# Patient Record
Sex: FEMALE | Race: WHITE | NOT HISPANIC OR LATINO | Employment: OTHER | URBAN - METROPOLITAN AREA
[De-identification: names, ages, dates, MRNs, and addresses within clinical notes are randomized per-mention and may not be internally consistent; named-entity substitution may affect disease eponyms.]

---

## 2017-01-05 ENCOUNTER — HISTORICAL (OUTPATIENT)
Dept: ADMINISTRATIVE | Facility: HOSPITAL | Age: 57
End: 2017-01-05

## 2017-01-09 ENCOUNTER — HOSPITAL ENCOUNTER (OUTPATIENT)
Dept: MEDSURG UNIT | Facility: HOSPITAL | Age: 57
End: 2017-01-12
Attending: INTERNAL MEDICINE | Admitting: INTERNAL MEDICINE

## 2017-01-16 ENCOUNTER — HISTORICAL (OUTPATIENT)
Dept: ANESTHESIOLOGY | Facility: HOSPITAL | Age: 57
End: 2017-01-16

## 2017-01-23 ENCOUNTER — HISTORICAL (OUTPATIENT)
Dept: PREADMISSION TESTING | Facility: HOSPITAL | Age: 57
End: 2017-01-23

## 2017-01-26 ENCOUNTER — HISTORICAL (OUTPATIENT)
Dept: SURGERY | Facility: HOSPITAL | Age: 57
End: 2017-01-26

## 2017-06-09 ENCOUNTER — HISTORICAL (OUTPATIENT)
Dept: ADMINISTRATIVE | Facility: HOSPITAL | Age: 57
End: 2017-06-09

## 2017-06-13 ENCOUNTER — HISTORICAL (OUTPATIENT)
Dept: RADIOLOGY | Facility: HOSPITAL | Age: 57
End: 2017-06-13

## 2017-08-08 ENCOUNTER — HISTORICAL (OUTPATIENT)
Dept: ADMINISTRATIVE | Facility: HOSPITAL | Age: 57
End: 2017-08-08

## 2017-10-17 ENCOUNTER — HISTORICAL (OUTPATIENT)
Dept: ADMINISTRATIVE | Facility: HOSPITAL | Age: 57
End: 2017-10-17

## 2018-10-16 ENCOUNTER — HISTORICAL (OUTPATIENT)
Dept: ADMINISTRATIVE | Facility: HOSPITAL | Age: 58
End: 2018-10-16

## 2019-04-08 ENCOUNTER — HISTORICAL (OUTPATIENT)
Dept: ADMINISTRATIVE | Facility: HOSPITAL | Age: 59
End: 2019-04-08

## 2019-10-15 ENCOUNTER — HISTORICAL (OUTPATIENT)
Dept: ADMINISTRATIVE | Facility: HOSPITAL | Age: 59
End: 2019-10-15

## 2019-11-22 ENCOUNTER — HISTORICAL (OUTPATIENT)
Dept: ADMINISTRATIVE | Facility: HOSPITAL | Age: 59
End: 2019-11-22

## 2020-03-09 ENCOUNTER — HISTORICAL (OUTPATIENT)
Dept: ADMINISTRATIVE | Facility: HOSPITAL | Age: 60
End: 2020-03-09

## 2021-02-01 ENCOUNTER — HOSPITAL ENCOUNTER (OUTPATIENT)
Dept: ADMINISTRATIVE | Facility: HOSPITAL | Age: 61
End: 2021-02-02
Attending: INTERNAL MEDICINE | Admitting: INTERNAL MEDICINE

## 2021-09-08 LAB
CHOLEST SERPL-MSCNC: 247 MG/DL (ref 0–200)
HDLC SERPL-MCNC: 52 MG/DL (ref 35–70)
LDLC SERPL CALC-MCNC: 32 MG/DL (ref 0–160)
TRIGL SERPL-MCNC: 175 MG/DL (ref 40–160)

## 2021-10-18 LAB — CRC RECOMMENDATION EXT: NORMAL

## 2022-01-28 LAB — BCS RECOMMENDATION EXT: NORMAL

## 2022-04-07 ENCOUNTER — HISTORICAL (OUTPATIENT)
Dept: ADMINISTRATIVE | Facility: HOSPITAL | Age: 62
End: 2022-04-07

## 2022-04-23 VITALS
WEIGHT: 150.13 LBS | HEIGHT: 63 IN | SYSTOLIC BLOOD PRESSURE: 132 MMHG | BODY MASS INDEX: 26.6 KG/M2 | DIASTOLIC BLOOD PRESSURE: 88 MMHG

## 2022-04-28 NOTE — ED PROVIDER NOTES
Patient:   Sania Cortez            MRN: [id number]            FIN: [id number]               Age:   60 years     Sex:  Female     :  1960   Associated Diagnoses:   None   Author:   Nilo Bruno MD      Addendum      Teaching-Supervisory Addendum-Brief   I participated in the following activities of this patients care: the medical history, the physical exam, medical decision making.   I personally performed: supervision of the patient's care, the medical history, the physical exam.   The case was discussed with: the physician assistant.   Evaluation and management service: I agree with the evaluation and management decisions made in this patient's care.   Results interpretation: I agree with the study interpretation in this patient's care.   Notes: I had face-to-face interaction with this patient..   None face-to-face interaction with this patient reveals that she's been having some shoulder pain for a few days.  This morning she had a chest pressure sensation prompted her to come to the emergency department.  Resolved prior to arrival.  She is a family history of CAD as well as a personal history of hypertension.  On physical exam heart tones are normal and lungs are clear to auscultation.  Initial ED workup is unremarkable.  Have discussed with on-call cardiologist and will admit for serial enzymes and rule out

## 2022-04-28 NOTE — ED PROVIDER NOTES
Patient:   Sania Cortez            MRN: [id number]            FIN: [id number]               Age:   60 years     Sex:  Female     :  1960   Associated Diagnoses:   Chest heaviness   Author:   Isma Gonzalez      Basic Information   Time seen: Date & time 2021 10:55:00.   History source: Patient, family.   Arrival mode: Private vehicle, walking.   History limitation: None.   Additional information: Patient's physician(s): Ashtyn YI, [address], Chief Complaint from Nursing Triage Note : Chief Complaint   2021 10:54 CST       Chief Complaint           L shoulder pain x 3 days intermittenet L sided CP. no SOB.    .      History of Present Illness   The patient presents with chest pain and Patient states left sided chest pain and shoulder pain x three days. states nausea. denies any SOB (OMID gallardo).   I Isma WILL assumed care of patient at 1415, female who reports over the last 3 days having left shoulder ache not improved with Advil last night having difficulty resting reports this morning having 15 minutes of left-sided chest pressure taking morning metoprolol medication improved with rest.   Patient reports having mild anxiety when remembering families cardiac history.  Patient transported by spouse to emergency department for evaluation..  The onset was 3  days ago.  Location: Left chest. The character of symptoms is heaviness.  Risk factors consist of hypertension and not diabetes mellitus.  Associated symptoms: anxiety, denies vomiting, denies diaphoresis and denies palpitations.        Review of Systems   Constitutional symptoms:  No fever, no chills, no fatigue.    Respiratory symptoms:  No shortness of breath, no cough, no sputum production.    Cardiovascular symptoms:  Chest pain, no palpitations, no tachycardia, no diaphoresis.    Gastrointestinal symptoms:  No abdominal pain, no nausea, no vomiting, no diarrhea.    Musculoskeletal  symptoms:  No back pain, no Muscle pain, no Joint pain.    Neurologic symptoms:  Dizziness, no headache, no altered level of consciousness.    Psychiatric symptoms:  Sleeping problems.   Endocrine symptoms:  Negative except as documented in HPI.   Hematologic/Lymphatic symptoms:  Negative except as documented in HPI.      Health Status   Allergies:    Allergic Reactions (Selected)  Severity Not Documented  Codeine- Vomiting.,    Allergies (1) Active Reaction  codeine vomiting  .   Medications:  (Selected)   Inpatient Medications  Ordered  Zofran 4 mg oral tablet: 4 mg, form: Tab, Oral, Once, first dose 01/26/17 14:42:00 CST, stop date 01/26/17 14:42:00 CST, 24  Pending Complete  midazolam: 2 mg, form: Injection, IV Push, q5min, Order duration: 2 dose(s), first dose 01/26/17 12:35:00 CST, stop date 01/26/17 12:44:00 CST, (up to 5 mg for moderate anxiety), 30,025  Prescriptions  Prescribed  calcium carbonate 500 mg oral tablet, chewable: 1,000 mg = 2 tab(s), Chewed, BID, # 20 tab(s), 0 Refill(s)  Documented Medications  Documented  CALCITRIOL 0.5 MCG CAPSULE: mcg cap(s), Oral  CoQ10 300 mg oral capsule: 300 mg = 1 cap(s), Oral, Daily, # 100 cap(s), 0 Refill(s)  Fish Oil 1200 mg oral capsule: 1 tab, Oral, BID, 0 Refill(s)  MAGNESIUM OXIDE 400 MG TABLET: mg tab(s), Oral  METOPROLOL SUCC ER 50 MG TAB: 50 mg = 1 tab(s), Oral, qAM  Synthroid 112 mcg (0.112 mg) oral tablet: 112 mcg = 1 tab(s), Oral, Daily  red yeast rice 600 mg oral capsule: 1 tab, Oral, BID, 0 Refill(s).      Past Medical/ Family/ Social History   Medical history: Reviewed as documented in chart, Biliary dyskinesia   High blood pressure   High cholesterol   Kidney stones   Mitral valve prolapse   Reflux gastritis   Thyroid cancer   .   Surgical history:    Cholecystectomy Laparoscopic (None) on 1/26/2017 at 56 Years.  Comments:  1/26/2017 13:46 CST - Roselia BATRES, Aurelia PEARSON  auto-populated from documented surgical case  Endoscopic Ultrasonography (Upper,  Upper, Upper) on 2017 at 56 Years.  Comments:  2017 13:34 [facility] - Patrica BATRES, Milan SOLOMON  auto-populated from documented surgical case  Esophagogastroduodenoscopy on 1/10/2017 at 56 Years.  Comments:  1/10/2017 13:16 BEVERLEY Linn RN, Farzad Nelson  auto-populated from documented surgical case  hysterectomy.  thyroidectomy.  Colonoscopy ([id number]).  Comments:  2019 15:34 CDT - Leidy Sweeney LPN  DONE IN 2017 WNL REPEAT 10 YEARS.   Family history:    Cardiac arrest.  Father ()  Mother  Hypertension.  Father ()  Hyperlipidemia.  Father ()  Mother  .   Social history:    Social & Psychosocial Habits    Alcohol  2019  Use: Current    Frequency: 1-2 times per month    Employment/School  2019  Status: Employed    Exercise  2019  Duration (average number of minutes): 30    Times per week: 3-4 times/week    Exercise type: Walking    Home/Environment  2019  Lives with: Spouse    Nutrition/Health  2019  Type of diet: Regular    Substance Use  2017  Use: Never    Tobacco  2019  Use: Never (less than 100 in l    Patient Wants Consult For Cessation Counseling N/A    .   Problem list:    Active Problems (7)  Biliary dyskinesia   High blood pressure   High cholesterol   Kidney stones   Mitral valve prolapse   Reflux gastritis   Thyroid cancer     .      Physical Examination               Vital Signs   Vital Signs   2021 10:54 CST       Temperature Oral          36.2 DegC                             Temperature Oral (calculated)             97.16 DegF                             Peripheral Pulse Rate     76 bpm                             Respiratory Rate          14 br/min                             SpO2                      100 %                             Oxygen Therapy            Room air                             Systolic Blood Pressure   185 mmHg  HI                             Diastolic Blood Pressure  98 mmHg  HI  .   Oxygen  saturation.   General:  Alert, no acute distress, Awake alert female resting in bed.    Skin:  Warm, dry, pink, normal for ethnicity.    Head:  Normocephalic, atraumatic.    Neck:  Supple, trachea midline, no JVD.    Eye:  Normal conjunctiva.   Cardiovascular:  Regular rate and rhythm, Normal peripheral perfusion, No edema.    Respiratory:  Lungs are clear to auscultation, respirations are non-labored, breath sounds are equal, Symmetrical chest wall expansion.    Chest wall:  No tenderness, No deformity.    Back:  Nontender, Normal range of motion.    Musculoskeletal:  All other adjacent joints normal, Proximal upper extremity: Left, anterior, shoulder, tenderness, range of motion normal, no swelling, no erythema, no ecchymosis.    Gastrointestinal:  Soft, Nontender, Non distended.    Neurological:  Alert and oriented to person, place, time, and situation, No focal neurological deficit observed, normal sensory observed, normal motor observed, normal speech observed.    Psychiatric:  Cooperative.      Medical Decision Making   Documents reviewed:  Emergency department nurses' notes, prior records.    Orders  Launch Orders   Pharmacy:  cloniDINE 0.1 mg oral tablet (Order): 0.1 mg, form: Tab, Oral, Once, first dose 2/1/2021 14:27 CST, stop date 2/1/2021 14:27 CST, STAT  , Launch Orders   Pharmacy:  aspirin 325 mg oral tablet (Order): 325 mg, form: Tab, Oral, Once, first dose 2/1/2021 14:55 CST, stop date 2/1/2021 14:55 CST, STAT, 4 chew tab = 5 grain ASA  , launch Order Profile (Selected)   Inpatient Orders  Completed  Automated Diff: NOW collect, 02/01/21 11:08:00 CST, Blood, Collected, Stop date 02/01/21 11:08:00 CST, Lab Collect, Print Label By Order Location, 02/01/21 10:56:00 CST  CBC w/ Auto Diff: NOW collect, 02/01/21 11:08:22 CST, BLOOD, Collected, Stop date 02/01/21 11:08:00 CST, Lab Collect  CMP: STAT collect, 02/01/21 11:08:22 CST, BLOOD, Collected, Stop date 02/01/21 11:08:00 CST, Lab Collect  Estimated  Glomerular Filtration Rate: STAT collect, 02/01/21 11:08:00 CST, Blood, Collected, Stop date 02/01/21 11:08:00 CST, Lab Collect, Print Label By Order Location, 02/01/21 10:56:00 CST  POC Istat ER Troponin: Blood, Stat collect, Collected, 02/01/21 11:17:20 CST  POC iSTAT ER Troponin request: BLOOD, STAT collect, Collected, 02/01/21 11:08:22 CST, Stop date 02/01/21 11:08:00 CST, Lab Collect, Print Label By Order Location  Troponin-I: STAT collect, 02/01/21 11:08:22 CST, BLOOD, Collected, Stop date 02/01/21 11:08:00 CST, Lab Collect  XR Chest 2 Views: Stat, 02/01/21 10:56:00 CST, Chest Pain, None, Stretcher, Patient Has IV?, Rad Type, Not Scheduled, 02/01/21 10:56:00 CST  .    Electrocardiogram:  Time 2/1/2021 10:58:00, rate 75, normal sinus rhythm, no ectopy, normal MO & QRS intervals, EP Interp, T wave Inversion, V3, Previous EKG available Compared with 1/9/2017 11:18:00.    Results review:  Lab results : Lab View   2/1/2021 11:56 CST       Est Creat Clearance Ser   44.57 mL/min    2/1/2021 11:17 CST       POC Troponin              0.00 ng/mL    2/1/2021 11:08 CST       Sodium Lvl                141 mmol/L                             Potassium Lvl             5.1 mmol/L                             Chloride                  104 mmol/L                             CO2                       27 mmol/L                             Calcium Lvl               9.0 mg/dL                             Glucose Lvl               102 mg/dL                             BUN                       20.1 mg/dL                             Creatinine                1.11 mg/dL  HI                             eGFR-AA                   >60  NA                             eGFR-BARAK                  53 mL/min/1.73 m2  NA                             Bili Total                0.4 mg/dL                             Bili Direct               0.1 mg/dL                             Bili Indirect             0.30 mg/dL                             AST                        38 unit/L  HI                             ALT                       40 unit/L                             Alk Phos                  64 unit/L                             Total Protein             7.3 gm/dL                             Albumin Lvl               4.5 gm/dL                             Globulin                  2.8 gm/dL                             A/G Ratio                 1.6 ratio                             Troponin-I                <0.010 ng/mL                             WBC                       6.1 x10(3)/mcL                             RBC                       4.59 x10(6)/mcL                             Hgb                       13.0 gm/dL                             Hct                       41.5 %                             Platelet                  217 x10(3)/mcL                             MCV                       90.4 fL                             MCH                       28.3 pg                             MCHC                      31.3 gm/dL  LOW                             RDW                       14.6 %                             MPV                       10.3 fL                             Abs Neut                  3.25 x10(3)/mcL                             Neutro Auto               53 %  NA                             Lymph Auto                40 %  NA                             Mono Auto                 4 %  NA                             Eos Auto                  2 %  NA                             Abs Eos                   0.1 x10(3)/mcL                             Basophil Auto             0 %  NA                             Abs Neutro                3.25 x10(3)/mcL                             Abs Lymph                 2.4 x10(3)/mcL                             Abs Mono                  0.3 x10(3)/mcL                             Abs Baso                  0.0 x10(3)/mcL    .   Radiology results:  Rad Results (ST)  < 12 hrs   Accession: [id number]  Order: XR  Chest 2 Views  Report Dt/Tm: 02/01/2021 11:21  Report:      CLINICAL:  Chest pain.     COMPARISON: January 23, 2017.     FINDINGS:  Cardiopericardial silhouette is within normal limits.   Lungs are without dense focal or segmental consolidation, congestion,  pleural effusion or pneumothorax. Scoliosis.     IMPRESSION:     No acute cardiopulmonary process identified.           .      Reexamination/ Reevaluation   Vital signs   results included from flowsheet : Vital Signs   2/1/2021 14:16 CST       Peripheral Pulse Rate     72 bpm                             SpO2                      100 %                             Oxygen Therapy            Room air                             Systolic Blood Pressure   173 mmHg  HI                             Diastolic Blood Pressure  99 mmHg  HI                             Mean Arterial Pressure, Cuff              124 mmHg     Assessment: While here in the ED, the pt remained hemodynamically stable with ABC's intact and in NaD.  Pt is afebrile and nontoxic.  Pt AAOx4 resting in ED room with no return of chest heaviness.  Pt understands labs, EKG xray results, concern for shoulder pain and chest heaviness.  Case reviewed in ER with Dr. Bruno.  Cardiology contacted and will admit..      Impression and Plan   Diagnosis   Chest heaviness (LBA42-OY R07.89)      Calls-Consults   -  2/1/2021 14:35:00 , Kirill Roberson, Card, phone call, recommends admit.    Plan   Condition: Stable.    Disposition: Admit.    Counseled: Patient, Family, Regarding diagnosis, Regarding diagnostic results, Regarding treatment plan, Patient indicated understanding of instructions.

## 2022-04-28 NOTE — H&P
"   Patient:   Sania Cortez            MRN: [id number]            FIN: [id number]               Age:   60 years     Sex:  Female     :  1960   Associated Diagnoses:   None   Author:   Kirill Roberson      Basic Information   Source of history:  Self, Medical record.    Referral source:  Emergency department.    History limitation:  None.    Resusitation Status:  Full Code.       Chief Complaint   2021 10:54 CST       L shoulder pain x 3 days intermittenet L sided CP. no SOB.        History of Present Illness   Mrs. Cortez is a 59yo female with a history of HTN, HLD, CKD II, MVP, and thyroid cancer s/p thyroidectomy who presented to the ER with complaints of chest pain. She states noticing intermittent chest "heaviness" that lasts 2-3 minutes.  She also reports left shoulder pain, worse at night. She denies any prior cardiac history other than MVP but does have a strong family history of CAD.  Upon evaluation in the ER she was hypertensive.  Her initial work up was negative other than some anterior T wave inversions on her EKG.  She was admitted for further evaluation.  MI has been ruled out by serial cardiac enzymes and she denies any recurrence of chest pain.       Review of Systems   Constitutional:  Negative.    Respiratory:  Negative.    Cardiovascular:  Negative.    Gastrointestinal:  Negative.    Genitourinary:  Negative.    Musculoskeletal:  Negative.    Integumentary:  Negative.    Neurologic:  Alert and oriented X4.       Health Status   Allergies:    Allergic Reactions (All)  Severity Not Documented  Codeine- Vomiting.   Problem list:    All Problems  Biliary dyskinesia / SNOMED CT 377713628 / Confirmed  High blood pressure / SNOMED CT 19753111 / Confirmed  High cholesterol / SNOMED CT 1812729791 / Confirmed  Kidney stones / SNOMED CT [id number] / Confirmed  Mitral valve prolapse / SNOMED CT 2571620780 / Confirmed  Reflux gastritis / SNOMED CT " 23626981 / Confirmed  Thyroid cancer / SNOMED CT 0547385695 / Confirmed      Histories   Past Medical History:    No active or resolved past medical history items have been selected or recorded.   Procedure history:    Cholecystectomy Laparoscopic (None) on 1/26/2017 at 56 Years.  Comments:  1/26/2017 13:46 [facility] - Roselia BATRES, Aurelia PEARSON  auto-populated from documented surgical case  Endoscopic Ultrasonography (Upper, Upper, Upper) on 1/11/2017 at 56 Years.  Comments:  1/11/2017 13:34 [facility] - Patrica BATRSE, Milan SOLOMON  auto-populated from documented surgical case  Esophagogastroduodenoscopy on 1/10/2017 at 56 Years.  Comments:  1/10/2017 13:16 [facility] - Kaveh BATRES, Farzad Nelson  auto-populated from documented surgical case  hysterectomy.  thyroidectomy.  Colonoscopy ([id number]).  Comments:  4/29/2019 15:34 CDT - Leidy Sweeney LPN  DONE IN JAN 2017 WNL REPEAT 10 YEARS      Physical Examination   General:  Alert and oriented, No acute distress.    HENT:  Normocephalic.    Neck:  Supple, Non-tender.    Respiratory:  Lungs are clear to auscultation, Respirations are non-labored, Breath sounds are equal.    Cardiovascular:  Normal rate, Regular rhythm.    Gastrointestinal:  Soft, Non-tender.    Musculoskeletal:  Normal range of motion, Normal strength.    Integumentary:  Warm, Dry, Pink.    Neurologic:  Alert, Oriented.    Psychiatric:  Cooperative, Appropriate mood & affect.       Impression and Plan   Chest pain      MI ruled out by serial cardiac enzymes x 3      denies any further chest pain      EKG revealed NSR with TWI in anterior leads     will d/c home today and have her follow up in our office next Wednesday with a nuclear stress test         she does have a strong family history of CAD    HTN     controlled     continue home medications    CKD      renal indices stable

## 2022-09-09 ENCOUNTER — DOCUMENTATION ONLY (OUTPATIENT)
Dept: INTERNAL MEDICINE | Facility: CLINIC | Age: 62
End: 2022-09-09
Payer: COMMERCIAL

## 2022-09-19 ENCOUNTER — DOCUMENTATION ONLY (OUTPATIENT)
Dept: INTERNAL MEDICINE | Facility: CLINIC | Age: 62
End: 2022-09-19
Payer: COMMERCIAL

## 2022-09-28 LAB
CHOLEST SERPL-MSCNC: 257 MG/DL (ref 100–199)
HDLC SERPL-MCNC: 56 MG/DL (ref 39–?)
LDLC SERPL CALC-MCNC: 175 MG/DL (ref 0–99)
TRIGL SERPL-MCNC: 142 MG/DL (ref 0–149)

## 2022-09-29 ENCOUNTER — TELEPHONE (OUTPATIENT)
Dept: INTERNAL MEDICINE | Facility: CLINIC | Age: 62
End: 2022-09-29
Payer: COMMERCIAL

## 2022-09-30 ENCOUNTER — DOCUMENTATION ONLY (OUTPATIENT)
Dept: INTERNAL MEDICINE | Facility: CLINIC | Age: 62
End: 2022-09-30
Payer: COMMERCIAL

## 2022-09-30 RX ORDER — METOPROLOL SUCCINATE 50 MG/1
75 TABLET, EXTENDED RELEASE ORAL DAILY
COMMUNITY
Start: 2022-09-14

## 2022-09-30 RX ORDER — TIZANIDINE 4 MG/1
4 TABLET ORAL NIGHTLY PRN
COMMUNITY
Start: 2022-04-08 | End: 2022-10-06

## 2022-09-30 RX ORDER — LOSARTAN POTASSIUM 25 MG/1
25 TABLET ORAL DAILY
COMMUNITY
Start: 2022-09-23

## 2022-09-30 RX ORDER — LEVOTHYROXINE SODIUM 100 UG/1
1 TABLET ORAL DAILY
COMMUNITY
Start: 2022-07-08

## 2022-09-30 RX ORDER — CALCITRIOL 0.5 UG/1
0.5 CAPSULE ORAL DAILY
COMMUNITY
Start: 2022-08-07

## 2022-10-01 ENCOUNTER — DOCUMENTATION ONLY (OUTPATIENT)
Dept: INTERNAL MEDICINE | Facility: CLINIC | Age: 62
End: 2022-10-01
Payer: COMMERCIAL

## 2022-10-06 ENCOUNTER — OFFICE VISIT (OUTPATIENT)
Dept: INTERNAL MEDICINE | Facility: CLINIC | Age: 62
End: 2022-10-06
Payer: COMMERCIAL

## 2022-10-06 VITALS
WEIGHT: 148.19 LBS | HEART RATE: 79 BPM | DIASTOLIC BLOOD PRESSURE: 84 MMHG | SYSTOLIC BLOOD PRESSURE: 134 MMHG | HEIGHT: 62 IN | OXYGEN SATURATION: 98 % | BODY MASS INDEX: 27.27 KG/M2 | TEMPERATURE: 99 F

## 2022-10-06 DIAGNOSIS — M25.519 SHOULDER PAIN, UNSPECIFIED CHRONICITY, UNSPECIFIED LATERALITY: ICD-10-CM

## 2022-10-06 DIAGNOSIS — Z00.00 WELLNESS EXAMINATION: Primary | ICD-10-CM

## 2022-10-06 PROCEDURE — 1160F RVW MEDS BY RX/DR IN RCRD: CPT | Mod: CPTII,,, | Performed by: INTERNAL MEDICINE

## 2022-10-06 PROCEDURE — 3075F PR MOST RECENT SYSTOLIC BLOOD PRESS GE 130-139MM HG: ICD-10-PCS | Mod: CPTII,,, | Performed by: INTERNAL MEDICINE

## 2022-10-06 PROCEDURE — 4010F ACE/ARB THERAPY RXD/TAKEN: CPT | Mod: CPTII,,, | Performed by: INTERNAL MEDICINE

## 2022-10-06 PROCEDURE — 1159F PR MEDICATION LIST DOCUMENTED IN MEDICAL RECORD: ICD-10-PCS | Mod: CPTII,,, | Performed by: INTERNAL MEDICINE

## 2022-10-06 PROCEDURE — 4010F PR ACE/ARB THEARPY RXD/TAKEN: ICD-10-PCS | Mod: CPTII,,, | Performed by: INTERNAL MEDICINE

## 2022-10-06 PROCEDURE — 99396 PREV VISIT EST AGE 40-64: CPT | Mod: ,,, | Performed by: INTERNAL MEDICINE

## 2022-10-06 PROCEDURE — 3079F PR MOST RECENT DIASTOLIC BLOOD PRESSURE 80-89 MM HG: ICD-10-PCS | Mod: CPTII,,, | Performed by: INTERNAL MEDICINE

## 2022-10-06 PROCEDURE — 3008F BODY MASS INDEX DOCD: CPT | Mod: CPTII,,, | Performed by: INTERNAL MEDICINE

## 2022-10-06 PROCEDURE — 3008F PR BODY MASS INDEX (BMI) DOCUMENTED: ICD-10-PCS | Mod: CPTII,,, | Performed by: INTERNAL MEDICINE

## 2022-10-06 PROCEDURE — 1160F PR REVIEW ALL MEDS BY PRESCRIBER/CLIN PHARMACIST DOCUMENTED: ICD-10-PCS | Mod: CPTII,,, | Performed by: INTERNAL MEDICINE

## 2022-10-06 PROCEDURE — 99396 PR PREVENTIVE VISIT,EST,40-64: ICD-10-PCS | Mod: ,,, | Performed by: INTERNAL MEDICINE

## 2022-10-06 PROCEDURE — 1159F MED LIST DOCD IN RCRD: CPT | Mod: CPTII,,, | Performed by: INTERNAL MEDICINE

## 2022-10-06 PROCEDURE — 3079F DIAST BP 80-89 MM HG: CPT | Mod: CPTII,,, | Performed by: INTERNAL MEDICINE

## 2022-10-06 PROCEDURE — 3075F SYST BP GE 130 - 139MM HG: CPT | Mod: CPTII,,, | Performed by: INTERNAL MEDICINE

## 2022-10-06 RX ORDER — MAGNESIUM 30 MG
1 TABLET ORAL ONCE
COMMUNITY

## 2022-10-06 NOTE — PROGRESS NOTES
Julio Helms MD        PATIENT NAME: Sania Cortez  : 1960  DATE: 10/6/22  MRN: [id number]      Billing Provider: Julio Helms MD  Level of Service: [place] PREVENTIVE VISIT,EST,40-64  Patient PCP Information       Provider PCP Type    Julio Helms MD General            Reason for Visit / Chief Complaint: Annual Exam (Wellness)       Update PCP  Update Chief Complaint         History of Present Illness / Problem Focused Workflow     Sania Cortez presents to the clinic with Annual Exam (Wellness)     Rukhsana is here for annual wellness exam   She is having a problem with chronic left shoulder pain   She went to the orthopedic urgent care and had an x-ray in therapy with still having pain.      Review of Systems   Review of Systems   Constitutional: Negative.    HENT: Negative.     Eyes: Negative.    Respiratory: Negative.     Cardiovascular: Negative.    Gastrointestinal: Negative.    Endocrine: Negative.    Genitourinary: Negative.    Musculoskeletal:  Positive for arthralgias.   Integumentary:  Negative.   Neurological: Negative.    Psychiatric/Behavioral: Negative.        Medical / Social / Family History     Past Medical History:   Diagnosis Date    Anemia, unspecified     GERD (gastroesophageal reflux disease)     Hydronephrosis     Hypocalcemia     Hypothyroidism, unspecified     Kidney stones     Osteopenia     Tachycardia     Thyroid cancer        Past Surgical History:   Procedure Laterality Date    CHOLECYSTECTOMY  2017    COLONOSCOPY  10/18/2021    HYSTERECTOMY  2001       Social History  Ms. Cortze  reports that she has never smoked. She has never used smokeless tobacco. She reports current alcohol use of about 2.0 standard drinks per week. She reports that she does not use drugs.    Family History  Ms.'s Cortez  family history includes Heart disease in her father and mother.    Medications and Allergies     Medications  Outpatient Medications Marked as Taking  for the 10/6/22 encounter (Office Visit) with Julio Chamorro MD   Medication Sig Dispense Refill    calcitRIOL (ROCALTROL) 0.5 MCG Cap Take 0.5 mcg by mouth once daily.      calcium cit/Mgox/vit D3/B6/min (CITRACAL PLUS ORAL) Take 1 tablet by mouth Daily. 1200 mg      losartan (COZAAR) 25 MG tablet Take 25 mg by mouth once daily.      magnesium 30 mg Tab Take 1 tablet by mouth once. 250 mg      metoprolol succinate (TOPROL-XL) 50 MG 24 hr tablet Take 75 mg by mouth once daily.      SYNTHROID 100 mcg tablet Take 1 tablet by mouth once daily.         Allergies  Review of patient's allergies indicates:   Allergen Reactions    Aspirin     Codeine Nausea And Vomiting     Other reaction(s): vomiting       Physical Examination     Vitals:    10/06/22 1036   BP: 134/84   Pulse: 79   Temp: 98.7 °F (37.1 °C)     Physical Exam  Constitutional:       Appearance: Normal appearance.   HENT:      Head: Normocephalic and atraumatic.      Right Ear: Tympanic membrane normal.      Left Ear: Tympanic membrane normal.      Nose: Nose normal.      Mouth/Throat:      Mouth: Mucous membranes are moist.   Eyes:      Extraocular Movements: Extraocular movements intact.      Pupils: Pupils are equal, round, and reactive to light.   Cardiovascular:      Rate and Rhythm: Normal rate and regular rhythm.      Pulses: Normal pulses.   Pulmonary:      Effort: Pulmonary effort is normal.      Breath sounds: Normal breath sounds.   Abdominal:      General: Abdomen is flat. Bowel sounds are normal.      Palpations: Abdomen is soft.   Musculoskeletal:         General: Normal range of motion.      Cervical back: Normal range of motion and neck supple.      Comments: Pain with movement in the left shoulder joint   Skin:     General: Skin is warm and dry.   Neurological:      General: No focal deficit present.      Mental Status: She is alert and oriented to person, place, and time.   Psychiatric:         Mood and Affect: Mood normal.          Behavior: Behavior normal.        Assessment and Plan (including Health Maintenance)      Problem List  Smart Sets  Document Outside HM   :    Plan:   Wellness examination    Shoulder pain, unspecified chronicity, unspecified laterality     Discussion all lab stable   Cholesterol his elevated bill 10 point since last year   Thyroid and calcium levels are stable   Referral made to orthopedics for shoulder evaluation   Revisit 1 year annual wellness           Health Maintenance Due   Topic Date Due    Shingles Vaccine (1 of 2) Never done    Influenza Vaccine (1) 09/01/2022       Problem List Items Addressed This Visit    None  Visit Diagnoses       Wellness examination    -  Primary    Shoulder pain, unspecified chronicity, unspecified laterality                Health Maintenance Topics with due status: Not Due       Topic Last Completion Date    Colorectal Cancer Screening 10/18/2021    Mammogram 01/28/2022    Lipid Panel 09/28/2022       No future appointments.         Signature:  Julio Helms MD  [facility]  [address] [place] [address]  [place] [id number]    Date of encounter: 10/6/22

## 2023-02-03 LAB — BCS RECOMMENDATION EXT: NORMAL

## 2023-02-08 ENCOUNTER — DOCUMENTATION ONLY (OUTPATIENT)
Dept: INTERNAL MEDICINE | Facility: CLINIC | Age: 63
End: 2023-02-08
Payer: COMMERCIAL

## 2024-06-04 ENCOUNTER — TELEPHONE (OUTPATIENT)
Dept: INTERNAL MEDICINE | Facility: CLINIC | Age: 64
End: 2024-06-04
Payer: COMMERCIAL

## 2024-06-04 NOTE — TELEPHONE ENCOUNTER
----- Message from Candy Dominguez sent at 6/4/2024 10:17 AM CDT -----  Who Called: Sania Mcmanus Dana    Caller is requesting assistance/information from provider's office.    Symptoms (please be specific):    How long has patient had these symptoms:    List of preferred pharmacies on file (remove unneeded): [unfilled]  If different, enter pharmacy into here including location and phone number:       Preferred Method of Contact: Phone Call  Patient's Preferred Phone Number on File: [phone]   Best Call Back Number, if different:  Additional Information: Sania is requesting information regarding when she had thyroid cancer. Did give # to medical records as well.

## 2024-07-27 ENCOUNTER — OFFICE VISIT (OUTPATIENT)
Dept: URGENT CARE | Facility: CLINIC | Age: 64
End: 2024-07-27
Payer: COMMERCIAL

## 2024-07-27 VITALS
DIASTOLIC BLOOD PRESSURE: 82 MMHG | TEMPERATURE: 99 F | BODY MASS INDEX: 27.07 KG/M2 | WEIGHT: 148 LBS | OXYGEN SATURATION: 100 % | SYSTOLIC BLOOD PRESSURE: 120 MMHG | HEART RATE: 96 BPM | RESPIRATION RATE: 18 BRPM

## 2024-07-27 DIAGNOSIS — R42 DIZZINESS: Primary | ICD-10-CM

## 2024-07-27 PROCEDURE — 99213 OFFICE O/P EST LOW 20 MIN: CPT | Mod: ,,, | Performed by: NURSE PRACTITIONER

## 2024-07-27 RX ORDER — PREDNISONE 20 MG/1
20 TABLET ORAL 2 TIMES DAILY
Qty: 6 TABLET | Refills: 0 | Status: SHIPPED | OUTPATIENT
Start: 2024-07-27 | End: 2024-07-30

## 2024-07-27 RX ORDER — MECLIZINE HYDROCHLORIDE 25 MG/1
25 TABLET ORAL 3 TIMES DAILY PRN
Qty: 21 TABLET | Refills: 0 | Status: SHIPPED | OUTPATIENT
Start: 2024-07-27

## 2024-07-27 NOTE — PROGRESS NOTES
Subjective:      Patient ID: Sania Cortez is a 63 y.o. female.    Vitals:  weight is 67.1 kg (148 lb). Her tympanic temperature is 98.8 °F (37.1 °C). Her blood pressure is 120/82 and her pulse is 96. Her respiration is 18 and oxygen saturation is 100%.     Chief Complaint: Dizziness     Patient is a 63 y.o. female who presents to urgent care with complaints of dizziness and buzzing in ears with headache x 2-3 days. Patient denies CP.   Denies any nausea or vomiting.  Denies any type of sinus congestion sinus pressure along with denying any type changes to vision numbness tingling difficulty with speech or any other neurological symptoms.  The dizziness is worse upon standing rapidly or bending over.      Neurological:  Positive for dizziness. Negative for history of vertigo, light-headedness and passing out.      Objective:     Physical Exam   Constitutional: She is oriented to person, place, and time. She appears well-developed. She is cooperative.  Non-toxic appearance. She does not appear ill. No distress.   HENT:   Head: Normocephalic and atraumatic.   Ears:   Right Ear: Hearing, tympanic membrane, external ear and ear canal normal.   Left Ear: Hearing, tympanic membrane, external ear and ear canal normal.   Nose: Nose normal. No mucosal edema, rhinorrhea or nasal deformity. No epistaxis. Right sinus exhibits no maxillary sinus tenderness and no frontal sinus tenderness. Left sinus exhibits no maxillary sinus tenderness and no frontal sinus tenderness.   Mouth/Throat: Uvula is midline, oropharynx is clear and moist and mucous membranes are normal. No trismus in the jaw. Normal dentition. No uvula swelling. No oropharyngeal exudate, posterior oropharyngeal edema or posterior oropharyngeal erythema.   Eyes: Conjunctivae and lids are normal. No scleral icterus.   Neck: Trachea normal and phonation normal. Neck supple. No edema present. No erythema present. No neck rigidity present.   Cardiovascular: Normal  rate, regular rhythm, normal heart sounds and normal pulses.   Pulmonary/Chest: Effort normal and breath sounds normal. No respiratory distress. She has no decreased breath sounds. She has no rhonchi.   Abdominal: Normal appearance.   Musculoskeletal: Normal range of motion.         General: No deformity. Normal range of motion.   Neurological: She is alert and oriented to person, place, and time. She exhibits normal muscle tone. Coordination normal.   Skin: Skin is warm, dry, intact, not diaphoretic and not pale.   Psychiatric: Her speech is normal and behavior is normal. Judgment and thought content normal.   Nursing note and vitals reviewed.      Assessment:     1. Dizziness        Plan:     Medications sent to pharmacy begin taking steroids today with food and take as directed   Meclizine also sent to pharmacy take this as needed for any dizziness, caution with this medication can cause some sedation effects   Monitor for any worsening symptoms and if you develop any type new onset weakness or numbness or any changes to your neurological status or any worsening headache please be re-evaluated immediately   Call with any questions or concerns otherwise follow up with PCP in the next 3-5 days.  Dizziness    Other orders  -     meclizine (ANTIVERT) 25 mg tablet; Take 1 tablet (25 mg total) by mouth 3 (three) times daily as needed for Dizziness.  Dispense: 21 tablet; Refill: 0  -     predniSONE (DELTASONE) 20 MG tablet; Take 1 tablet (20 mg total) by mouth 2 (two) times daily. for 3 days  Dispense: 6 tablet; Refill: 0

## 2024-07-27 NOTE — PATIENT INSTRUCTIONS
Medications sent to pharmacy begin taking steroids today with food and take as directed   Meclizine also sent to pharmacy take this as needed for any dizziness, caution with this medication can cause some sedation effects   Monitor for any worsening symptoms and if you develop any type new onset weakness or numbness or any changes to your neurological status or any worsening headache please be re-evaluated immediately   Call with any questions or concerns otherwise follow up with PCP in the next 3-5 days.